# Patient Record
Sex: FEMALE | URBAN - METROPOLITAN AREA
[De-identification: names, ages, dates, MRNs, and addresses within clinical notes are randomized per-mention and may not be internally consistent; named-entity substitution may affect disease eponyms.]

---

## 2018-09-25 ENCOUNTER — APPOINTMENT (RX ONLY)
Dept: URBAN - METROPOLITAN AREA CLINIC 154 | Facility: CLINIC | Age: 28
Setting detail: DERMATOLOGY
End: 2018-09-25

## 2018-09-25 DIAGNOSIS — L27.0 GENERALIZED SKIN ERUPTION DUE TO DRUGS AND MEDICAMENTS TAKEN INTERNALLY: ICD-10-CM | Status: RESOLVING

## 2018-09-25 PROBLEM — L30.9 DERMATITIS, UNSPECIFIED: Status: ACTIVE | Noted: 2018-09-25

## 2018-09-25 PROBLEM — F41.9 ANXIETY DISORDER, UNSPECIFIED: Status: ACTIVE | Noted: 2018-09-25

## 2018-09-25 PROBLEM — F32.9 MAJOR DEPRESSIVE DISORDER, SINGLE EPISODE, UNSPECIFIED: Status: ACTIVE | Noted: 2018-09-25

## 2018-09-25 PROBLEM — J45.909 UNSPECIFIED ASTHMA, UNCOMPLICATED: Status: ACTIVE | Noted: 2018-09-25

## 2018-09-25 PROBLEM — L70.0 ACNE VULGARIS: Status: ACTIVE | Noted: 2018-09-25

## 2018-09-25 PROCEDURE — ? PRESCRIPTION

## 2018-09-25 PROCEDURE — ? ADDITIONAL NOTES

## 2018-09-25 PROCEDURE — ? COUNSELING

## 2018-09-25 PROCEDURE — 99202 OFFICE O/P NEW SF 15 MIN: CPT

## 2018-09-25 RX ORDER — TRIAMCINOLONE ACETONIDE 1 MG/G
CREAM TOPICAL BID
Qty: 1 | Refills: 1 | COMMUNITY
Start: 2018-09-25

## 2018-09-25 RX ADMIN — TRIAMCINOLONE ACETONIDE: 1 CREAM TOPICAL at 17:11

## 2018-09-25 ASSESSMENT — LOCATION SIMPLE DESCRIPTION DERM: LOCATION SIMPLE: ABDOMEN

## 2018-09-25 ASSESSMENT — LOCATION ZONE DERM: LOCATION ZONE: TRUNK

## 2018-09-25 ASSESSMENT — LOCATION DETAILED DESCRIPTION DERM
LOCATION DETAILED: EPIGASTRIC SKIN
LOCATION DETAILED: PERIUMBILICAL SKIN

## 2018-09-25 NOTE — HPI: RASH
How Severe Is Your Rash?: mild
Is This A New Presentation, Or A Follow-Up?: Rash
Additional History: Patient reports starting a new medication recently and then rash started, she discontinued medication and rash is more improved than what it was. She used Benadryl and topical hydrocortisone which has helped control itching, which is mild at this time.

## 2018-09-25 NOTE — PROCEDURE: ADDITIONAL NOTES
Additional Notes: If no improvement after 2 weeks, advised to present back to office and biopsy will be done at that time. Also advised to wear loose fitting clothing, if sweats advised to shower and change within the hour, cool water during showering
Detail Level: Simple

## 2018-10-23 ENCOUNTER — APPOINTMENT (RX ONLY)
Dept: URBAN - METROPOLITAN AREA CLINIC 154 | Facility: CLINIC | Age: 28
Setting detail: DERMATOLOGY
End: 2018-10-23

## 2018-10-23 DIAGNOSIS — L83 ACANTHOSIS NIGRICANS: ICD-10-CM | Status: RESOLVING

## 2018-10-23 PROCEDURE — ? PRESCRIPTION

## 2018-10-23 PROCEDURE — ? COUNSELING

## 2018-10-23 PROCEDURE — ? TREATMENT REGIMEN

## 2018-10-23 PROCEDURE — 99212 OFFICE O/P EST SF 10 MIN: CPT

## 2018-10-23 RX ORDER — CALCIPOTRIENE 50 UG/G
CREAM TOPICAL
Qty: 1 | Refills: 0 | COMMUNITY
Start: 2018-10-23

## 2018-10-23 RX ADMIN — CALCIPOTRIENE: 50 CREAM TOPICAL at 18:29

## 2018-10-23 ASSESSMENT — LOCATION DETAILED DESCRIPTION DERM: LOCATION DETAILED: EPIGASTRIC SKIN

## 2018-10-23 ASSESSMENT — LOCATION ZONE DERM: LOCATION ZONE: TRUNK

## 2018-10-23 ASSESSMENT — SEVERITY ASSESSMENT: SEVERITY: MILD

## 2018-10-23 ASSESSMENT — LOCATION SIMPLE DESCRIPTION DERM: LOCATION SIMPLE: ABDOMEN

## 2018-10-23 NOTE — PROCEDURE: TREATMENT REGIMEN
Detail Level: Zone
Initiate Treatment: Rotation of selsun blue and head&shoulders to wash area. Calcipotriene topically to affected area